# Patient Record
Sex: FEMALE | Race: WHITE | URBAN - METROPOLITAN AREA
[De-identification: names, ages, dates, MRNs, and addresses within clinical notes are randomized per-mention and may not be internally consistent; named-entity substitution may affect disease eponyms.]

---

## 2020-01-30 ENCOUNTER — OFFICE VISIT (OUTPATIENT)
Dept: DERMATOLOGY | Age: 85
End: 2020-01-30
Payer: COMMERCIAL

## 2020-01-30 PROCEDURE — 17110 DESTRUCTION B9 LES UP TO 14: CPT | Performed by: DERMATOLOGY

## 2020-01-30 PROCEDURE — 11103 TANGNTL BX SKIN EA SEP/ADDL: CPT | Performed by: DERMATOLOGY

## 2020-01-30 PROCEDURE — 11102 TANGNTL BX SKIN SINGLE LES: CPT | Performed by: DERMATOLOGY

## 2020-01-30 PROCEDURE — 99203 OFFICE O/P NEW LOW 30 MIN: CPT | Performed by: DERMATOLOGY

## 2020-01-30 RX ORDER — FAMOTIDINE 40 MG/1
40 TABLET, FILM COATED ORAL DAILY
COMMUNITY

## 2020-01-30 SDOH — HEALTH STABILITY: MENTAL HEALTH: HOW OFTEN DO YOU HAVE A DRINK CONTAINING ALCOHOL?: MONTHLY OR LESS

## 2020-01-30 SDOH — HEALTH STABILITY: MENTAL HEALTH: HOW MANY STANDARD DRINKS CONTAINING ALCOHOL DO YOU HAVE ON A TYPICAL DAY?: 1 OR 2

## 2020-01-30 NOTE — PATIENT INSTRUCTIONS
Biopsy Wound Care Instructions     Keep the bandage in place for 24 hours.  Cleanse the wound with mild soapy water daily   Gently dry the area.  Apply Vaseline or petroleum jelly to the wound using a cotton tipped applicator.  Cover with a clean bandage.  Repeat this process until the biopsy site is healed.  If you had stitches placed, continue treating the site until the stitches are removed. Remember to make an appointment to return to have your stitches removed by our staff.  You may shower and bathe as usual.     ** Biopsy results generally take around 7 business days to come back. If you have not heard from us by then, please call the office at [phone] between 8AM and 4PM Monday through Friday.

## 2020-01-30 NOTE — PROGRESS NOTES
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 ABCDE's of melanoma also reviewed    2.)  Rule out atypical nevi on R inner arm and L buttock:   -Shave biopsy x 2     -Informed consent obtained    -Area marked, cleaned, anesthetized with 1% lidocaine with epinephrine     -Shave biopsy performed    -Hemostasis obtained with aluminum chloride    -Aquaphor ointment and bandage applied    -Specimen(s) sent to dermatopathology lab for analysis   -Edu: woundcare, bleeding, infection, scar     3.) ISK R malar cheek:  - LN2 x 15 sec x 2 cycles x 1 lesions; edu: irritation, dyspigmentation, redness, possible recurrence, use of Vaseline until healed    Return to Clinic:  1 yr and prn changes; will call if ISK doesn't resolve  Discussed plan with patient and/or primary caretaker. Patient to call clinic with any questions / concerns. Reviewed side effects of treatment(s) and/or medication(s) with patient and/or primary caretaker.    AVS provided or is available on [employer]   ____________________________________________________________________________   Mark Madden MD, MPH, FAAD  [facility], [address]

## 2020-02-04 LAB — DERMATOLOGY PATHOLOGY REPORT: NORMAL

## 2020-02-18 ENCOUNTER — OFFICE VISIT (OUTPATIENT)
Dept: DERMATOLOGY | Age: 85
End: 2020-02-18

## 2020-02-18 PROCEDURE — MISCDES14 COSMETIC DESTRUCTION (CRYO/ED) OF BENIGN LESION - UP TO 14: Performed by: DERMATOLOGY

## 2020-02-18 NOTE — PROGRESS NOTES
effects of treatment(s) and/or medication(s) with patient and/or primary caretaker.    AVS provided or is available on [facility]   ____________________________________________________________________________   Tianna Rosas MD, MPH, FAAD  [facility], [address] [address]

## 2020-07-30 ENCOUNTER — HOSPITAL ENCOUNTER (OUTPATIENT)
Dept: ONCOLOGY | Age: 85
Setting detail: INFUSION SERIES
Discharge: HOME | End: 2020-07-30
Payer: SUBSIDIZED

## 2020-07-30 LAB
A/G RATIO: 1.5 (ref 1.1–2.2)
ABO/RH: NORMAL
ALBUMIN SERPL-MCNC: 4.6 G/DL (ref 3.4–5)
ALP BLD-CCNC: 62 U/L (ref 40–129)
ALT SERPL-CCNC: 13 U/L (ref 10–40)
ANION GAP SERPL CALCULATED.3IONS-SCNC: 9 MMOL/L (ref 3–16)
ANTIBODY SCREEN: NORMAL
APTT: 31.4 SEC (ref 24.2–36.2)
AST SERPL-CCNC: 18 U/L (ref 15–37)
BACTERIA: ABNORMAL /HPF
BASOPHILS ABSOLUTE: 0.1 K/UL (ref 0–0.2)
BASOPHILS RELATIVE PERCENT: 1.1 %
BILIRUB SERPL-MCNC: 0.3 MG/DL (ref 0–1)
BILIRUBIN URINE: NEGATIVE
BLOOD, URINE: ABNORMAL
BUN BLDV-MCNC: 14 MG/DL (ref 7–20)
CALCIUM SERPL-MCNC: 9.4 MG/DL (ref 8.3–10.6)
CHLORIDE BLD-SCNC: 103 MMOL/L (ref 99–110)
CLARITY: ABNORMAL
CO2: 29 MMOL/L (ref 21–32)
COLOR: YELLOW
CREAT SERPL-MCNC: 0.7 MG/DL (ref 0.6–1.1)
EOSINOPHILS ABSOLUTE: 0.2 K/UL (ref 0–0.6)
EOSINOPHILS RELATIVE PERCENT: 2.6 %
EPITHELIAL CELLS, UA: ABNORMAL /HPF (ref 0–5)
GFR AFRICAN AMERICAN: >60
GFR NON-AFRICAN AMERICAN: >60
GLOBULIN: 3 G/DL
GLUCOSE BLD-MCNC: 86 MG/DL (ref 70–99)
GLUCOSE URINE: NEGATIVE MG/DL
HCG QUALITATIVE: NEGATIVE
HCT VFR BLD CALC: 40.9 % (ref 36–48)
HEMOGLOBIN: 13.9 G/DL (ref 12–16)
INR BLD: 1 (ref 0.86–1.14)
KETONES, URINE: NEGATIVE MG/DL
LACTATE DEHYDROGENASE: 153 U/L (ref 100–190)
LEUKOCYTE ESTERASE, URINE: ABNORMAL
LYMPHOCYTES ABSOLUTE: 1.9 K/UL (ref 1–5.1)
LYMPHOCYTES RELATIVE PERCENT: 29.3 %
MCH RBC QN AUTO: 29.7 PG (ref 26–34)
MCHC RBC AUTO-ENTMCNC: 34 G/DL (ref 31–36)
MCV RBC AUTO: 87.5 FL (ref 80–100)
MICROSCOPIC EXAMINATION: YES
MONOCYTES ABSOLUTE: 0.5 K/UL (ref 0–1.3)
MONOCYTES RELATIVE PERCENT: 7.5 %
MUCUS: ABNORMAL /LPF
NEUTROPHILS ABSOLUTE: 3.9 K/UL (ref 1.7–7.7)
NEUTROPHILS RELATIVE PERCENT: 59.5 %
NITRITE, URINE: POSITIVE
PDW BLD-RTO: 13.2 % (ref 12.4–15.4)
PH UA: 6 (ref 5–8)
PLATELET # BLD: 392 K/UL (ref 135–450)
PMV BLD AUTO: 6.3 FL (ref 5–10.5)
POTASSIUM SERPL-SCNC: 4.4 MMOL/L (ref 3.5–5.1)
PROTEIN UA: NEGATIVE MG/DL
PROTHROMBIN TIME: 11.6 SEC (ref 10–13.2)
RBC # BLD: 4.67 M/UL (ref 4–5.2)
RBC UA: ABNORMAL /HPF (ref 0–4)
SODIUM BLD-SCNC: 141 MMOL/L (ref 136–145)
SPECIFIC GRAVITY UA: 1.02 (ref 1–1.03)
TOTAL PROTEIN: 7.6 G/DL (ref 6.4–8.2)
URINE TYPE: ABNORMAL
UROBILINOGEN, URINE: 0.2 E.U./DL
WBC # BLD: 6.6 K/UL (ref 4–11)
WBC UA: ABNORMAL /HPF (ref 0–5)

## 2020-07-30 PROCEDURE — 86900 BLOOD TYPING SEROLOGIC ABO: CPT

## 2020-07-30 PROCEDURE — 86901 BLOOD TYPING SEROLOGIC RH(D): CPT

## 2020-07-30 PROCEDURE — 85025 COMPLETE CBC W/AUTO DIFF WBC: CPT

## 2020-07-30 PROCEDURE — 85730 THROMBOPLASTIN TIME PARTIAL: CPT

## 2020-07-30 PROCEDURE — 81001 URINALYSIS AUTO W/SCOPE: CPT

## 2020-07-30 PROCEDURE — 84703 CHORIONIC GONADOTROPIN ASSAY: CPT

## 2020-07-30 PROCEDURE — 86850 RBC ANTIBODY SCREEN: CPT

## 2020-07-30 PROCEDURE — 80053 COMPREHEN METABOLIC PANEL: CPT

## 2020-07-30 PROCEDURE — 83615 LACTATE (LD) (LDH) ENZYME: CPT

## 2020-07-30 PROCEDURE — 85610 PROTHROMBIN TIME: CPT

## 2020-07-30 NOTE — ONCOLOGY
NMDP donor here for labs, PE and teach. Plan for bone marrow harvest on 8/19/20.  Discussed bone marrow harvest procedure and  Recovery

## 2020-07-30 NOTE — ONCOLOGY
Transplant Coordinator met with donor in [facility] before scheduled bone marrow harvest procedure. Donor confirmed use of Hibiclens bathing as previously instructed. Reviewed bone marrow harvest procedure and recovery. Coordinator will provide discharge instructions and take home meds to patient and caregiver after surgery completed. Following Bone Marrow [place]:    Post Operative Appointment:        You have a large pressure dressing on the back of your hips. This should stay in place until your follow up visit the day after the procedure. At that visit you will be assessed to make sure you are recovering as expected. At this visit the bandage is removed and the wounds are assessed and covered with Band-Aids. Do not shower until after this appointment. Discomfort and stiffness can last up to 10 days. The transplant team can provide you with documentation needed to miss work or school. How to Care for Yourself Following Bone Marrow [place]:     Go home and rest for a few days, keep your legs raised whenever possible.  Take your pain medication before you absolutely need it. Be aware that some pain medications may cause nausea. Do not take it on an empty stomach. Be sure to report any side effects to your coordinator.  Use only acetaminophen products as over-the-counter pain medication. These products will not prolong bleeding.  Applying ice to the harvest sites for additional pain relief is also recommended.  It is important to drink plenty of fluids preferably non-caffeine/non-alcoholic beverages. It takes time for your body to adapt to the loss of fluid.  Continuing to move around without over-exerting yourself may help prevent stiffness in your back where the marrow was collected.  You may experience difficulty in climbing stairs, lifting objects or bending over; do not push yourself to do any of these activities for several days.    Bruises may continue to enlarge for a few days after the procedure.  Do not expect to return to pre-collection activities for about two to three weeks, maybe longer for more strenuous activities.  You may experience muscle pain and/or muscle fatigue in your back and legs.  You may be more tired than usual for several days to weeks.  Plan to take at least a few days off from work; perhaps more if your job is physically demanding. If you have any questions or concerns regarding time off from work or school, please discuss them with your coordinator.  Take advantage of the care and kindness offered by loved ones. Share the information provided for you regarding your care and recovery. You can always reach someone if you have questions or problems. During business hours (8 am-4:30 pm) you can call the [place] [address] office at [phone] and ask for one of the coordinators. After hours, on weekends and holidays you can reach the physician on call by calling [phone] ([phone]). Ask for the bone marrow transplant physician on call.

## 2020-08-19 ENCOUNTER — HOSPITAL ENCOUNTER (OUTPATIENT)
Dept: ONCOLOGY | Age: 85
Setting detail: INFUSION SERIES
Discharge: HOME | End: 2020-08-19
Payer: SUBSIDIZED

## 2020-08-19 LAB — HCG QUALITATIVE: NEGATIVE

## 2020-08-19 PROCEDURE — 84703 CHORIONIC GONADOTROPIN ASSAY: CPT

## 2020-09-04 ENCOUNTER — HOSPITAL ENCOUNTER (OUTPATIENT)
Dept: ONCOLOGY | Age: 85
Discharge: HOME | End: 2020-09-04